# Patient Record
Sex: MALE | Race: BLACK OR AFRICAN AMERICAN | Employment: FULL TIME | URBAN - METROPOLITAN AREA
[De-identification: names, ages, dates, MRNs, and addresses within clinical notes are randomized per-mention and may not be internally consistent; named-entity substitution may affect disease eponyms.]

---

## 2019-05-04 ENCOUNTER — HOSPITAL ENCOUNTER (OUTPATIENT)
Age: 34
Setting detail: OBSERVATION
Discharge: HOME | End: 2019-05-05
Attending: EMERGENCY MEDICINE | Admitting: INTERNAL MEDICINE
Payer: COMMERCIAL

## 2019-05-04 ENCOUNTER — APPOINTMENT (OUTPATIENT)
Dept: GENERAL RADIOLOGY | Age: 34
End: 2019-05-04
Payer: COMMERCIAL

## 2019-05-04 ENCOUNTER — APPOINTMENT (OUTPATIENT)
Dept: CT IMAGING | Age: 34
End: 2019-05-04
Payer: COMMERCIAL

## 2019-05-04 DIAGNOSIS — T18.108A FOREIGN BODY IN ESOPHAGUS, INITIAL ENCOUNTER: Primary | ICD-10-CM

## 2019-05-04 LAB
ABSOLUTE EOS #: 0.59 K/UL (ref 0–0.44)
ABSOLUTE IMMATURE GRANULOCYTE: 0.01 K/UL (ref 0–0.3)
ABSOLUTE LYMPH #: 1.8 K/UL (ref 1.1–3.7)
ABSOLUTE MONO #: 0.48 K/UL (ref 0.1–1.2)
ANION GAP SERPL CALCULATED.3IONS-SCNC: 13 MMOL/L (ref 9–17)
BASOPHILS # BLD: 1 % (ref 0–2)
BASOPHILS ABSOLUTE: 0.05 K/UL (ref 0–0.2)
BUN BLDV-MCNC: 14 MG/DL (ref 6–20)
BUN/CREAT BLD: 13 (ref 9–20)
CALCIUM SERPL-MCNC: 9 MG/DL (ref 8.6–10.4)
CHLORIDE BLD-SCNC: 105 MMOL/L (ref 98–107)
CO2: 22 MMOL/L (ref 20–31)
CREAT SERPL-MCNC: 1.11 MG/DL (ref 0.7–1.2)
DIFFERENTIAL TYPE: ABNORMAL
EOSINOPHILS RELATIVE PERCENT: 9 % (ref 1–4)
GFR AFRICAN AMERICAN: >60 ML/MIN
GFR NON-AFRICAN AMERICAN: >60 ML/MIN
GFR SERPL CREATININE-BSD FRML MDRD: NORMAL ML/MIN/{1.73_M2}
GFR SERPL CREATININE-BSD FRML MDRD: NORMAL ML/MIN/{1.73_M2}
GLUCOSE BLD-MCNC: 92 MG/DL (ref 70–99)
HCT VFR BLD CALC: 47.1 % (ref 40.7–50.3)
HEMOGLOBIN: 15 G/DL (ref 13–17)
IMMATURE GRANULOCYTES: 0 %
LYMPHOCYTES # BLD: 27 % (ref 24–43)
MCH RBC QN AUTO: 26.7 PG (ref 25.2–33.5)
MCHC RBC AUTO-ENTMCNC: 31.8 G/DL (ref 28.4–34.8)
MCV RBC AUTO: 84 FL (ref 82.6–102.9)
MONOCYTES # BLD: 7 % (ref 3–12)
NRBC AUTOMATED: 0 PER 100 WBC
PDW BLD-RTO: 13.6 % (ref 11.8–14.4)
PLATELET # BLD: 197 K/UL (ref 138–453)
PLATELET ESTIMATE: ABNORMAL
PMV BLD AUTO: 11.2 FL (ref 8.1–13.5)
POTASSIUM SERPL-SCNC: 3.9 MMOL/L (ref 3.7–5.3)
RBC # BLD: 5.61 M/UL (ref 4.21–5.77)
RBC # BLD: ABNORMAL 10*6/UL
SEG NEUTROPHILS: 57 % (ref 36–65)
SEGMENTED NEUTROPHILS ABSOLUTE COUNT: 3.8 K/UL (ref 1.5–8.1)
SODIUM BLD-SCNC: 140 MMOL/L (ref 135–144)
WBC # BLD: 6.7 K/UL (ref 3.5–11.3)
WBC # BLD: ABNORMAL 10*3/UL

## 2019-05-04 PROCEDURE — 96375 TX/PRO/DX INJ NEW DRUG ADDON: CPT

## 2019-05-04 PROCEDURE — 70491 CT SOFT TISSUE NECK W/DYE: CPT

## 2019-05-04 PROCEDURE — 71250 CT THORAX DX C-: CPT

## 2019-05-04 PROCEDURE — 71046 X-RAY EXAM CHEST 2 VIEWS: CPT

## 2019-05-04 PROCEDURE — 85025 COMPLETE CBC W/AUTO DIFF WBC: CPT

## 2019-05-04 PROCEDURE — 2500000003 HC RX 250 WO HCPCS: Performed by: NURSE PRACTITIONER

## 2019-05-04 PROCEDURE — 2580000003 HC RX 258: Performed by: NURSE PRACTITIONER

## 2019-05-04 PROCEDURE — 36415 COLL VENOUS BLD VENIPUNCTURE: CPT

## 2019-05-04 PROCEDURE — 6360000002 HC RX W HCPCS: Performed by: NURSE PRACTITIONER

## 2019-05-04 PROCEDURE — 99244 OFF/OP CNSLTJ NEW/EST MOD 40: CPT | Performed by: INTERNAL MEDICINE

## 2019-05-04 PROCEDURE — 96374 THER/PROPH/DIAG INJ IV PUSH: CPT

## 2019-05-04 PROCEDURE — 6360000004 HC RX CONTRAST MEDICATION: Performed by: NURSE PRACTITIONER

## 2019-05-04 PROCEDURE — 43247 EGD REMOVE FOREIGN BODY: CPT | Performed by: INTERNAL MEDICINE

## 2019-05-04 PROCEDURE — 80048 BASIC METABOLIC PNL TOTAL CA: CPT

## 2019-05-04 PROCEDURE — 99285 EMERGENCY DEPT VISIT HI MDM: CPT

## 2019-05-04 RX ORDER — SODIUM CHLORIDE 0.9 % (FLUSH) 0.9 %
10 SYRINGE (ML) INJECTION PRN
Status: DISCONTINUED | OUTPATIENT
Start: 2019-05-04 | End: 2019-05-05 | Stop reason: HOSPADM

## 2019-05-04 RX ORDER — METOCLOPRAMIDE HYDROCHLORIDE 5 MG/ML
10 INJECTION INTRAMUSCULAR; INTRAVENOUS ONCE
Status: COMPLETED | OUTPATIENT
Start: 2019-05-04 | End: 2019-05-04

## 2019-05-04 RX ORDER — 0.9 % SODIUM CHLORIDE 0.9 %
80 INTRAVENOUS SOLUTION INTRAVENOUS ONCE
Status: COMPLETED | OUTPATIENT
Start: 2019-05-04 | End: 2019-05-04

## 2019-05-04 RX ORDER — PANTOPRAZOLE SODIUM 40 MG/1
40 TABLET, DELAYED RELEASE ORAL ONCE
Status: DISCONTINUED | OUTPATIENT
Start: 2019-05-04 | End: 2019-05-04

## 2019-05-04 RX ADMIN — IOPAMIDOL 75 ML: 755 INJECTION, SOLUTION INTRAVENOUS at 22:24

## 2019-05-04 RX ADMIN — SODIUM CHLORIDE 80 ML: 9 INJECTION, SOLUTION INTRAVENOUS at 22:24

## 2019-05-04 RX ADMIN — METOCLOPRAMIDE 10 MG: 5 INJECTION, SOLUTION INTRAMUSCULAR; INTRAVENOUS at 23:34

## 2019-05-04 RX ADMIN — Medication 10 ML: at 22:25

## 2019-05-04 RX ADMIN — GLUCAGON HYDROCHLORIDE 1 MG: KIT at 23:34

## 2019-05-05 ENCOUNTER — APPOINTMENT (OUTPATIENT)
Dept: GENERAL RADIOLOGY | Age: 34
End: 2019-05-05
Payer: COMMERCIAL

## 2019-05-05 ENCOUNTER — ANESTHESIA EVENT (OUTPATIENT)
Dept: OPERATING ROOM | Age: 34
End: 2019-05-05
Payer: COMMERCIAL

## 2019-05-05 ENCOUNTER — ANESTHESIA (OUTPATIENT)
Dept: OPERATING ROOM | Age: 34
End: 2019-05-05
Payer: COMMERCIAL

## 2019-05-05 VITALS
HEART RATE: 85 BPM | SYSTOLIC BLOOD PRESSURE: 139 MMHG | BODY MASS INDEX: 38.94 KG/M2 | HEIGHT: 69 IN | TEMPERATURE: 98.4 F | OXYGEN SATURATION: 98 % | RESPIRATION RATE: 16 BRPM | DIASTOLIC BLOOD PRESSURE: 77 MMHG | WEIGHT: 262.9 LBS

## 2019-05-05 VITALS
DIASTOLIC BLOOD PRESSURE: 61 MMHG | SYSTOLIC BLOOD PRESSURE: 129 MMHG | RESPIRATION RATE: 13 BRPM | OXYGEN SATURATION: 93 %

## 2019-05-05 PROCEDURE — C9113 INJ PANTOPRAZOLE SODIUM, VIA: HCPCS | Performed by: INTERNAL MEDICINE

## 2019-05-05 PROCEDURE — 3700000000 HC ANESTHESIA ATTENDED CARE: Performed by: INTERNAL MEDICINE

## 2019-05-05 PROCEDURE — 3609012900 HC EGD FOREIGN BODY REMOVAL: Performed by: INTERNAL MEDICINE

## 2019-05-05 PROCEDURE — 6360000002 HC RX W HCPCS: Performed by: INTERNAL MEDICINE

## 2019-05-05 PROCEDURE — 2580000003 HC RX 258: Performed by: NURSE PRACTITIONER

## 2019-05-05 PROCEDURE — 6370000000 HC RX 637 (ALT 250 FOR IP): Performed by: NURSE PRACTITIONER

## 2019-05-05 PROCEDURE — 96365 THER/PROPH/DIAG IV INF INIT: CPT

## 2019-05-05 PROCEDURE — APPNB30 APP NON BILLABLE TIME 0-30 MINS: Performed by: NURSE PRACTITIONER

## 2019-05-05 PROCEDURE — 99225 PR SBSQ OBSERVATION CARE/DAY 25 MINUTES: CPT | Performed by: INTERNAL MEDICINE

## 2019-05-05 PROCEDURE — 7100000000 HC PACU RECOVERY - FIRST 15 MIN: Performed by: INTERNAL MEDICINE

## 2019-05-05 PROCEDURE — 2580000003 HC RX 258: Performed by: ANESTHESIOLOGY

## 2019-05-05 PROCEDURE — 71046 X-RAY EXAM CHEST 2 VIEWS: CPT

## 2019-05-05 PROCEDURE — 6360000002 HC RX W HCPCS: Performed by: NURSE PRACTITIONER

## 2019-05-05 PROCEDURE — 3700000001 HC ADD 15 MINUTES (ANESTHESIA): Performed by: INTERNAL MEDICINE

## 2019-05-05 PROCEDURE — C9113 INJ PANTOPRAZOLE SODIUM, VIA: HCPCS | Performed by: NURSE PRACTITIONER

## 2019-05-05 PROCEDURE — 2580000003 HC RX 258: Performed by: INTERNAL MEDICINE

## 2019-05-05 PROCEDURE — 2709999900 HC NON-CHARGEABLE SUPPLY: Performed by: INTERNAL MEDICINE

## 2019-05-05 PROCEDURE — 2780000010 HC IMPLANT OTHER: Performed by: INTERNAL MEDICINE

## 2019-05-05 PROCEDURE — G0378 HOSPITAL OBSERVATION PER HR: HCPCS

## 2019-05-05 PROCEDURE — 99219 PR INITIAL OBSERVATION CARE/DAY 50 MINUTES: CPT | Performed by: INTERNAL MEDICINE

## 2019-05-05 PROCEDURE — 7100000001 HC PACU RECOVERY - ADDTL 15 MIN: Performed by: INTERNAL MEDICINE

## 2019-05-05 PROCEDURE — 2500000003 HC RX 250 WO HCPCS: Performed by: INTERNAL MEDICINE

## 2019-05-05 DEVICE — WORKING LENGTH 235CM, WORKING CHANNEL 2.8MM
Type: IMPLANTABLE DEVICE | Status: FUNCTIONAL
Brand: RESOLUTION 360 CLIP

## 2019-05-05 RX ORDER — PANTOPRAZOLE SODIUM 40 MG/10ML
40 INJECTION, POWDER, LYOPHILIZED, FOR SOLUTION INTRAVENOUS EVERY 12 HOURS SCHEDULED
Status: DISCONTINUED | OUTPATIENT
Start: 2019-05-05 | End: 2019-05-05 | Stop reason: HOSPADM

## 2019-05-05 RX ORDER — MAGNESIUM SULFATE 1 G/100ML
1 INJECTION INTRAVENOUS PRN
Status: DISCONTINUED | OUTPATIENT
Start: 2019-05-05 | End: 2019-05-05 | Stop reason: HOSPADM

## 2019-05-05 RX ORDER — FENTANYL CITRATE 50 UG/ML
25 INJECTION, SOLUTION INTRAMUSCULAR; INTRAVENOUS EVERY 5 MIN PRN
Status: DISCONTINUED | OUTPATIENT
Start: 2019-05-05 | End: 2019-05-05 | Stop reason: HOSPADM

## 2019-05-05 RX ORDER — SUCRALFATE 1 G/1
1 TABLET ORAL 4 TIMES DAILY
Qty: 60 TABLET | Refills: 0 | Status: SHIPPED | OUTPATIENT
Start: 2019-05-05 | End: 2019-05-20

## 2019-05-05 RX ORDER — LABETALOL HYDROCHLORIDE 5 MG/ML
5 INJECTION, SOLUTION INTRAVENOUS EVERY 10 MIN PRN
Status: DISCONTINUED | OUTPATIENT
Start: 2019-05-05 | End: 2019-05-05 | Stop reason: HOSPADM

## 2019-05-05 RX ORDER — OXYCODONE HYDROCHLORIDE AND ACETAMINOPHEN 5; 325 MG/1; MG/1
2 TABLET ORAL PRN
Status: DISCONTINUED | OUTPATIENT
Start: 2019-05-05 | End: 2019-05-05 | Stop reason: HOSPADM

## 2019-05-05 RX ORDER — SODIUM CHLORIDE 0.9 % (FLUSH) 0.9 %
10 SYRINGE (ML) INJECTION EVERY 12 HOURS SCHEDULED
Status: DISCONTINUED | OUTPATIENT
Start: 2019-05-05 | End: 2019-05-05 | Stop reason: HOSPADM

## 2019-05-05 RX ORDER — SUCRALFATE ORAL 1 G/10ML
1 SUSPENSION ORAL EVERY 6 HOURS SCHEDULED
Status: DISCONTINUED | OUTPATIENT
Start: 2019-05-05 | End: 2019-05-05

## 2019-05-05 RX ORDER — OXYCODONE HYDROCHLORIDE AND ACETAMINOPHEN 5; 325 MG/1; MG/1
1 TABLET ORAL PRN
Status: DISCONTINUED | OUTPATIENT
Start: 2019-05-05 | End: 2019-05-05 | Stop reason: HOSPADM

## 2019-05-05 RX ORDER — DIPHENHYDRAMINE HYDROCHLORIDE 50 MG/ML
12.5 INJECTION INTRAMUSCULAR; INTRAVENOUS
Status: DISCONTINUED | OUTPATIENT
Start: 2019-05-05 | End: 2019-05-05 | Stop reason: HOSPADM

## 2019-05-05 RX ORDER — SODIUM CHLORIDE 9 MG/ML
INJECTION, SOLUTION INTRAVENOUS CONTINUOUS
Status: DISCONTINUED | OUTPATIENT
Start: 2019-05-05 | End: 2019-05-05 | Stop reason: HOSPADM

## 2019-05-05 RX ORDER — ACETAMINOPHEN 325 MG/1
650 TABLET ORAL EVERY 4 HOURS PRN
Status: DISCONTINUED | OUTPATIENT
Start: 2019-05-05 | End: 2019-05-05 | Stop reason: HOSPADM

## 2019-05-05 RX ORDER — ONDANSETRON 2 MG/ML
4 INJECTION INTRAMUSCULAR; INTRAVENOUS EVERY 6 HOURS PRN
Status: DISCONTINUED | OUTPATIENT
Start: 2019-05-05 | End: 2019-05-05 | Stop reason: HOSPADM

## 2019-05-05 RX ORDER — 0.9 % SODIUM CHLORIDE 0.9 %
10 VIAL (ML) INJECTION EVERY 12 HOURS SCHEDULED
Status: DISCONTINUED | OUTPATIENT
Start: 2019-05-05 | End: 2019-05-05 | Stop reason: SDUPTHER

## 2019-05-05 RX ORDER — DEXAMETHASONE SODIUM PHOSPHATE 4 MG/ML
4 INJECTION, SOLUTION INTRA-ARTICULAR; INTRALESIONAL; INTRAMUSCULAR; INTRAVENOUS; SOFT TISSUE
Status: DISCONTINUED | OUTPATIENT
Start: 2019-05-05 | End: 2019-05-05 | Stop reason: HOSPADM

## 2019-05-05 RX ORDER — HYDROMORPHONE HYDROCHLORIDE 1 MG/ML
0.5 INJECTION, SOLUTION INTRAMUSCULAR; INTRAVENOUS; SUBCUTANEOUS EVERY 5 MIN PRN
Status: DISCONTINUED | OUTPATIENT
Start: 2019-05-05 | End: 2019-05-05 | Stop reason: HOSPADM

## 2019-05-05 RX ORDER — SUCRALFATE 1 G/1
1 TABLET ORAL 4 TIMES DAILY
Qty: 60 TABLET | Refills: 0 | Status: SHIPPED | OUTPATIENT
Start: 2019-05-05 | End: 2019-05-05

## 2019-05-05 RX ORDER — MEPERIDINE HYDROCHLORIDE 50 MG/ML
12.5 INJECTION INTRAMUSCULAR; INTRAVENOUS; SUBCUTANEOUS EVERY 5 MIN PRN
Status: DISCONTINUED | OUTPATIENT
Start: 2019-05-05 | End: 2019-05-05 | Stop reason: HOSPADM

## 2019-05-05 RX ORDER — PANTOPRAZOLE SODIUM 40 MG/1
40 TABLET, DELAYED RELEASE ORAL
Qty: 30 TABLET | Refills: 0 | Status: SHIPPED | OUTPATIENT
Start: 2019-05-05 | End: 2019-05-20

## 2019-05-05 RX ORDER — 0.9 % SODIUM CHLORIDE 0.9 %
10 VIAL (ML) INJECTION EVERY 12 HOURS SCHEDULED
Status: DISCONTINUED | OUTPATIENT
Start: 2019-05-05 | End: 2019-05-05 | Stop reason: HOSPADM

## 2019-05-05 RX ORDER — SUCRALFATE 1 G/1
1 TABLET ORAL EVERY 6 HOURS SCHEDULED
Status: DISCONTINUED | OUTPATIENT
Start: 2019-05-05 | End: 2019-05-05 | Stop reason: HOSPADM

## 2019-05-05 RX ORDER — SODIUM CHLORIDE 9 MG/ML
INJECTION, SOLUTION INTRAVENOUS CONTINUOUS PRN
Status: DISCONTINUED | OUTPATIENT
Start: 2019-05-05 | End: 2019-05-05 | Stop reason: SDUPTHER

## 2019-05-05 RX ORDER — ONDANSETRON 2 MG/ML
4 INJECTION INTRAMUSCULAR; INTRAVENOUS
Status: DISCONTINUED | OUTPATIENT
Start: 2019-05-05 | End: 2019-05-05 | Stop reason: HOSPADM

## 2019-05-05 RX ORDER — SUCRALFATE 1 G/1
1 TABLET ORAL 4 TIMES DAILY
Qty: 60 TABLET | Refills: 0 | Status: SHIPPED | OUTPATIENT
Start: 2019-05-05 | End: 2019-05-05 | Stop reason: HOSPADM

## 2019-05-05 RX ORDER — ONDANSETRON 4 MG/1
4 TABLET, ORALLY DISINTEGRATING ORAL EVERY 6 HOURS PRN
Status: DISCONTINUED | OUTPATIENT
Start: 2019-05-05 | End: 2019-05-05 | Stop reason: HOSPADM

## 2019-05-05 RX ORDER — NICOTINE 21 MG/24HR
1 PATCH, TRANSDERMAL 24 HOURS TRANSDERMAL DAILY PRN
Status: DISCONTINUED | OUTPATIENT
Start: 2019-05-05 | End: 2019-05-05 | Stop reason: HOSPADM

## 2019-05-05 RX ORDER — ONDANSETRON 2 MG/ML
4 INJECTION INTRAMUSCULAR; INTRAVENOUS EVERY 6 HOURS PRN
Status: DISCONTINUED | OUTPATIENT
Start: 2019-05-05 | End: 2019-05-05 | Stop reason: SDUPTHER

## 2019-05-05 RX ORDER — SODIUM CHLORIDE 0.9 % (FLUSH) 0.9 %
10 SYRINGE (ML) INJECTION PRN
Status: DISCONTINUED | OUTPATIENT
Start: 2019-05-05 | End: 2019-05-05 | Stop reason: HOSPADM

## 2019-05-05 RX ORDER — HYDRALAZINE HYDROCHLORIDE 20 MG/ML
5 INJECTION INTRAMUSCULAR; INTRAVENOUS EVERY 10 MIN PRN
Status: DISCONTINUED | OUTPATIENT
Start: 2019-05-05 | End: 2019-05-05 | Stop reason: HOSPADM

## 2019-05-05 RX ORDER — PANTOPRAZOLE SODIUM 40 MG/10ML
40 INJECTION, POWDER, LYOPHILIZED, FOR SOLUTION INTRAVENOUS EVERY 12 HOURS
Status: DISCONTINUED | OUTPATIENT
Start: 2019-05-05 | End: 2019-05-05 | Stop reason: SDUPTHER

## 2019-05-05 RX ORDER — PANTOPRAZOLE SODIUM 40 MG/1
40 TABLET, DELAYED RELEASE ORAL
Qty: 30 TABLET | Refills: 0 | Status: SHIPPED | OUTPATIENT
Start: 2019-05-05 | End: 2019-05-05 | Stop reason: HOSPADM

## 2019-05-05 RX ORDER — PANTOPRAZOLE SODIUM 40 MG/1
40 TABLET, DELAYED RELEASE ORAL
Qty: 30 TABLET | Refills: 0 | Status: SHIPPED | OUTPATIENT
Start: 2019-05-05 | End: 2019-05-05 | Stop reason: SDUPTHER

## 2019-05-05 RX ORDER — POTASSIUM CHLORIDE 20 MEQ/1
40 TABLET, EXTENDED RELEASE ORAL PRN
Status: DISCONTINUED | OUTPATIENT
Start: 2019-05-05 | End: 2019-05-05 | Stop reason: HOSPADM

## 2019-05-05 RX ORDER — DEXTROSE, SODIUM CHLORIDE, AND POTASSIUM CHLORIDE 5; .45; .15 G/100ML; G/100ML; G/100ML
INJECTION INTRAVENOUS CONTINUOUS
Status: DISCONTINUED | OUTPATIENT
Start: 2019-05-05 | End: 2019-05-05

## 2019-05-05 RX ORDER — POTASSIUM CHLORIDE 7.45 MG/ML
10 INJECTION INTRAVENOUS PRN
Status: DISCONTINUED | OUTPATIENT
Start: 2019-05-05 | End: 2019-05-05 | Stop reason: HOSPADM

## 2019-05-05 RX ADMIN — PIPERACILLIN SODIUM,TAZOBACTAM SODIUM 3.38 G: 3; .375 INJECTION, POWDER, FOR SOLUTION INTRAVENOUS at 02:17

## 2019-05-05 RX ADMIN — SODIUM CHLORIDE: 9 INJECTION, SOLUTION INTRAVENOUS at 03:48

## 2019-05-05 RX ADMIN — Medication 10 ML: at 10:12

## 2019-05-05 RX ADMIN — Medication 10 ML: at 10:17

## 2019-05-05 RX ADMIN — PANTOPRAZOLE SODIUM 40 MG: 40 INJECTION, POWDER, FOR SOLUTION INTRAVENOUS at 10:10

## 2019-05-05 RX ADMIN — SODIUM CHLORIDE: 9 INJECTION, SOLUTION INTRAVENOUS at 00:52

## 2019-05-05 RX ADMIN — PANTOPRAZOLE SODIUM 40 MG: 40 INJECTION, POWDER, FOR SOLUTION INTRAVENOUS at 00:02

## 2019-05-05 RX ADMIN — SUCRALFATE 1 G: 1 TABLET ORAL at 14:10

## 2019-05-05 RX ADMIN — TAZOBACTAM SODIUM AND PIPERACILLIN SODIUM 3.38 G: 375; 3 INJECTION, SOLUTION INTRAVENOUS at 10:10

## 2019-05-05 ASSESSMENT — PULMONARY FUNCTION TESTS
PIF_VALUE: 1
PIF_VALUE: 0
PIF_VALUE: 0
PIF_VALUE: 1
PIF_VALUE: 3
PIF_VALUE: 1
PIF_VALUE: 0
PIF_VALUE: 0
PIF_VALUE: 1
PIF_VALUE: 0
PIF_VALUE: 3
PIF_VALUE: 1
PIF_VALUE: 0
PIF_VALUE: 0
PIF_VALUE: 1
PIF_VALUE: 0
PIF_VALUE: 1
PIF_VALUE: 0
PIF_VALUE: 1
PIF_VALUE: 0
PIF_VALUE: 0
PIF_VALUE: 1

## 2019-05-05 ASSESSMENT — PAIN SCALES - GENERAL
PAINLEVEL_OUTOF10: 0

## 2019-05-05 NOTE — PROGRESS NOTES
Patient arrived to the room via bed, report given at bedside, patient is not in any distress of discomfort. Pt given orientation to the room and all  Admission questions answered.  Patient has no further questions at this time

## 2019-05-05 NOTE — H&P
[facility]    HISTORY AND PHYSICAL EXAMINATION            Date:   5/5/2019  Patient name:  Marlena Vo  Date of admission:  5/4/2019  9:39 PM  MRN:   [id number]  Account:  [de-identified]  YOB: 1985  PCP:    No primary care provider on file. Room:   2033/2033-01  Code Status:    Full Code    Chief Complaint:     Chief Complaint   Patient presents with    Dysphagia       History Obtained From:     patient, electronic medical record    History of Present Illness:     Admitted through ER with following history;      Marlena Vo is a 36 y.o. male who presents to the emergency department via private auto after choking episode occurred less than one hour prior to arrival.  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 He is not having any difficulty breathing but continues to have foreign body sensation in the throat. He has had similar episodes over the past couple of years that did not require an emergency room visit. He has never been evaluated for these symptoms. He denies history of GERD or heartburn. He was found to have large Food impaction at the distal esophagus with small hiatus hernia on EGD  Food  was impacted with 2 cm laceration of the esophagus, for details see the EGD report below    He has been started on IV antibiotics and was kept under observation overnight to look for any perforation.   Patient denies any difficulty breathing or pain, new chest x-ray did not show any pneumomediastinum    Following his EGD report and diagnosis:  Diagnosis:  Large food impaction at the distal esophagus and in a small hiatal hernia sac, we was pushed through the stomach, but while I was pushing it down to the stomach gently the patient coughed forcefully and subtly and jammed that area again his the scope, after that the food was pushed to the stomach but I see a 2 cm laceration right at the [address] [place] [address] & [facility] junction, which was fairly deep, I put one clip on the top and around with another clip under that but the patient kept coughing, and Breathing very hardly moving the lower area very badly where I felt unsafe anymore to stay there not to extend the laceration he had. The scope was pulled  Patient was awake after the anesthesia stopped  He denied any chest pain he denied any shortness breath is vitals To be very normal  But because of the laceration being so deep medicine for stat x-ray at this time  Might need also a stat CT chest if there is any concern on the chest x-ray  And then will keep this patient for observation and put him on antibiotics in case she had a perforation  Patient breath sounds were equal and clear, and there is no crepitus under the skin to indicate perforation clinically, but will get the x-rays as mentioned above        The food right now it is in the stomach and the stomach looked normal  Duodenum was not examined because we have to pull out        Past Medical History:     History reviewed. No pertinent past medical history. Past Surgical History:     Past Surgical History:   Procedure Laterality Date    ENDOSCOPY, COLON, DIAGNOSTIC  05/05/2019    foreign body removal and application of hemostatic clips        Medications Prior to Admission:     Prior to Admission medications    Not on File        Allergies:     Patient has no known allergies. Social History:     Tobacco:    has no tobacco history on file. Alcohol:      has no alcohol history on file. Drug Use:  has no drug history on file. Family History:     History reviewed. No pertinent family history. Review of Systems:     Positive and Negative as described in HPI.     CONSTITUTIONAL:  negative for fevers, chills, sweats, fatigue, weight loss  HEENT:  negative for vision, hearing changes, runny nose,+ throat pain with feeling of foreign body in esophagus  RESPIRATORY:  negative for shortness of breath, cough, or splenomegaly  Neurologic: There are no new focal motor or sensory deficits, normal muscle tone and bulk, no abnormal sensation, normal speech, cranial nerves II through XII grossly intact  Skin: No gross lesions, rashes, bruising or bleeding on exposed skin area  Extremities:  peripheral pulses palpable, no pedal edema or calf pain with palpation  Psych: normal affect     Investigations:      Laboratory Testing:  Recent Results (from the past 24 hour(s))   CBC Auto Differential    Collection Time: 05/04/19 10:00 PM   Result Value Ref Range    WBC 6.7 3.5 - 11.3 k/uL    RBC 5.61 4.21 - 5.77 m/uL    Hemoglobin 15.0 13.0 - 17.0 g/dL    Hematocrit 47.1 40.7 - 50.3 %    MCV 84.0 82.6 - 102.9 fL    MCH 26.7 25.2 - 33.5 pg    MCHC 31.8 28.4 - 34.8 g/dL    RDW 13.6 11.8 - 14.4 %    Platelets 342 343 - 425 k/uL    MPV 11.2 8.1 - 13.5 fL    NRBC Automated 0.0 0.0 per 100 WBC    Differential Type NOT REPORTED     WBC Morphology NOT REPORTED     RBC Morphology NOT REPORTED     Platelet Estimate NOT REPORTED     Seg Neutrophils 57 36 - 65 %    Lymphocytes 27 24 - 43 %    Monocytes 7 3 - 12 %    Eosinophils % 9 (H) 1 - 4 %    Basophils 1 0 - 2 %    Immature Granulocytes 0 0 %    Segs Absolute 3.80 1.50 - 8.10 k/uL    Absolute Lymph # 1.80 1.10 - 3.70 k/uL    Absolute Mono # 0.48 0.10 - 1.20 k/uL    Absolute Eos # 0.59 (H) 0.00 - 0.44 k/uL    Basophils # 0.05 0.00 - 0.20 k/uL    Absolute Immature Granulocyte 0.01 0.00 - 0.30 k/uL   Basic Metabolic Panel    Collection Time: 05/04/19 10:00 PM   Result Value Ref Range    Glucose 92 70 - 99 mg/dL    BUN 14 6 - 20 mg/dL    CREATININE 1.11 0.70 - 1.20 mg/dL    Bun/Cre Ratio 13 9 - 20    Calcium 9.0 8.6 - 10.4 mg/dL    Sodium 140 135 - 144 mmol/L    Potassium 3.9 3.7 - 5.3 mmol/L    Chloride 105 98 - 107 mmol/L    CO2 22 20 - 31 mmol/L    Anion Gap 13 9 - 17 mmol/L    GFR Non-African American >60 >60 mL/min    GFR African American >60 >60 mL/min    GFR Comment          GFR Staging NOT valleculae, epiglottis, aryepiglottic folds and pyriform sinuses appear unremarkable. The true and false vocal cords are normal in appearance. No mass or abscess is seen. SALIVARY GLANDS/THYROID:  The parotid and submandibular glands appear unremarkable. The thyroid gland appears unremarkable. LYMPH NODES:  No cervical or supraclavicular lymphadenopathy is seen. SOFT TISSUES:  No appreciable soft tissue swelling or mass is seen. BRAIN/ORBITS/SINUSES:  The visualized portion of the intracranial contents appear unremarkable. The visualized portion of the orbits and mastoid air cells demonstrate no acute abnormality. Mild pansinus disease. LUNG APICES/SUPERIOR MEDIASTINUM:  No focal consolidation is seen within the visualized lung apices. No superior mediastinal lymphadenopathy or mass. The visualized portion of the trachea appears unremarkable. BONES:  No aggressive appearing lytic or blastic bony lesion. Mild multilevel cervical spondylosis. No acute abnormality of the soft tissue structures of the neck. No radiopaque foreign body. Mild paranasal sinus disease. Ct Chest Wo Contrast    Result Date: 5/4/2019  EXAMINATION: CT OF THE CHEST WITHOUT CONTRAST 5/4/2019 10:42 pm TECHNIQUE: CT of the chest was performed without the administration of intravenous contrast. Multiplanar reformatted images are provided for review. Dose modulation, iterative reconstruction, and/or weight based adjustment of the mA/kV was utilized to reduce the radiation dose to as low as reasonably achievable. COMPARISON: Chest x-ray 05/04/2019 at 2206 hours HISTORY: ORDERING SYSTEM PROVIDED HISTORY: fb sensation FINDINGS: Mediastinum: There is a small hiatal hernia with central soft tissue nodule (axial image 77-89). The esophagus proximal to the small hiatal hernia is slightly dilated and filled with fluid and gas. There is no mediastinal adenopathy. Thoracic aorta is normal in caliber. Heart size is normal. Lungs/pleura:  The lungs are clear. No pneumothorax or pleural fluid. Upper Abdomen: Scans performed through the upper abdomen are unremarkable. There is contrast material in the kidneys from recent contrast CT examination of the neck. Soft Tissues/Bones: There is no acute bone or soft tissue abnormality. There is a small hiatal hernia with central soft tissue nodule suspicious for food bolus impaction. Follow-up endoscopy may be required. The esophagus proximal to the hiatal hernia is mildly dilated. Chest CT is otherwise unremarkable. Assessment :      Primary Problem  Food impaction of esophagus-status post EGD and disimpaction of stuck food  2 cm laceration of esophagus  Active Hospital Problems    Diagnosis Date Noted    Food impaction of esophagus [T18.128A]        Plan:     Patient status Admit as inpatient in the  Progressive Unit/Step down    1. Continue Zosyn every 6 hours  2. Already started on clears by GI. 3.  Discharge when cleared by GI possibly today afternoon    Consultations:   IP CONSULT TO GI  IP CONSULT TO INTERNAL MEDICINE     Patient is admitted as inpatient status because of co-morbidities listed above, severity of signs and symptoms as outlined, requirement for current medical therapies and most importantly because of direct risk to patient if care not provided in a hospital setting. Ronnie Chavez MD  5/5/2019  9:01 AM    Copy sent to Dr. Sophia Reyna primary care provider on file.

## 2019-05-05 NOTE — DISCHARGE SUMMARY
[facility]    Discharge Summary     Patient ID: Gloria Chao  :  1985   MRN: [id number]     ACCOUNT:  [de-identified]   Patient's PCP: No primary care provider on file. Admit Date: 2019   Discharge Date: 2019     Length of Stay: 0  Code Status:  Full Code  Admitting Physician: Ramiro Reina DO  Discharge Physician: Tram Meyer MD     Active Discharge Diagnoses:     Hospital Problem Lists:  Principal Problem:    Food impaction of esophagus  Active Problems:    Foreign body in esophagus  Resolved Problems:    * No resolved hospital problems. *      Admission Condition:  poor     Discharged Condition: good    Hospital Stay:     Hospital Course:   Armnado Haider a 36 y. o. male who presents to the emergency department via private auto after choking episode occurred less than one hour prior to arrival.  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 Nely Polanco is not having any difficulty breathing but continues to have foreign body sensation in the throat.  He has had similar episodes over the past couple of years that did not require an emergency room visit. Nely Polanco has never been evaluated for these symptoms. Nely Polanco denies history of GERD or heartburn. He was found to have large Food impaction at the distal esophagus with small hiatus hernia on EGD  Food  was impacted with 2 cm laceration of the esophagus, for details see the EGD report below     He has been started on IV antibiotics and was kept under observation overnight to look for any perforation.   Patient denies any difficulty breathing or pain, new chest x-ray did not show any pneumomediastinum     Following his EGD report and diagnosis:  Diagnosis:  Large food impaction at the distal esophagus and in a small hiatal hernia sac, we was pushed through the stomach, but while I was pushing it down to the stomach gently the patient coughed forcefully and subtly and jammed that area again his the scope, after that the food was pushed to the stomach but I see a 2 cm laceration right at the GE junction, which was fairly deep, I put one clip on the top and around with another clip under that but the patient kept coughing, and Breathing very hardly moving the lower area very badly where I felt unsafe anymore to stay there not to extend the laceration he had.   The scope was pulled  Patient was awake after the anesthesia stopped  He denied any chest pain he denied any shortness breath is vitals To be very normal  But because of the laceration being so deep medicine for stat x-ray at this time  Might need also a stat CT chest if there is any concern on the chest x-ray  And then will keep this patient for observation and put him on antibiotics in case she had a perforation  Patient breath sounds were equal and clear, and there is no crepitus under the skin to indicate perforation clinically, but will get the x-rays as mentioned above        The food right now it is in the stomach and the stomach looked normal  Duodenum was not examined because we have to pull out     Primary Problem  Food impaction of esophagus-status post EGD and disimpaction of stuck food  2 cm laceration of esophagus       Active Hospital Problems     Diagnosis Date Noted    Food impaction of esophagus [T18.128A]        Plan;  Patient tolerated clear liquid diet  Morning chest x-ray negative for any pneumomediastinum  Cleared by GI to her discharged on oral PPI is and Carafate              Significant therapeutic interventions: See above notes    Significant Diagnostic Studies:   Labs / Micro:  CBC:   Lab Results   Component Value Date    WBC 6.7 05/04/2019    RBC 5.61 05/04/2019    HGB 15.0 05/04/2019    HCT 47.1 05/04/2019    MCV 84.0 05/04/2019    MCH 26.7 05/04/2019    MCHC 31.8 05/04/2019    RDW 13.6 05/04/2019     05/04/2019     BMP:    Lab Results   Component Value Date    GLUCOSE 92 body sensation TECHNOLOGIST PROVIDED HISTORY: resolved choking episode while eating. Foreign body sensation Ordering Physician Provided Reason for Exam: FB sensation in throat Acuity: Acute Type of Exam: Initial FINDINGS: Lungs are clear. No cardiomegaly. No pulmonary edema. Negative chest radiographs. Ct Soft Tissue Neck W Contrast    Result Date: 5/4/2019  EXAMINATION: CT OF THE NECK SOFT TISSUE WITH CONTRAST  5/4/2019 TECHNIQUE: CT of the neck was performed with the administration of intravenous contrast. Multiplanar reformatted images are provided for review. Dose modulation, iterative reconstruction, and/or weight based adjustment of the mA/kV was utilized to reduce the radiation dose to as low as reasonably achievable. COMPARISON: None. HISTORY: ORDERING SYSTEM PROVIDED HISTORY: Foreign body sensation, choking episode while eating prior to arrival FINDINGS: PHARYNX/LARYNX:  The palatine tonsils are normal in appearance. The tongue is normal in appearance. The valleculae, epiglottis, aryepiglottic folds and pyriform sinuses appear unremarkable. The true and false vocal cords are normal in appearance. No mass or abscess is seen. SALIVARY GLANDS/THYROID:  The parotid and submandibular glands appear unremarkable. The thyroid gland appears unremarkable. LYMPH NODES:  No cervical or supraclavicular lymphadenopathy is seen. SOFT TISSUES:  No appreciable soft tissue swelling or mass is seen. BRAIN/ORBITS/SINUSES:  The visualized portion of the intracranial contents appear unremarkable. The visualized portion of the orbits and mastoid air cells demonstrate no acute abnormality. Mild pansinus disease. LUNG APICES/SUPERIOR MEDIASTINUM:  No focal consolidation is seen within the visualized lung apices. No superior mediastinal lymphadenopathy or mass. The visualized portion of the trachea appears unremarkable. BONES:  No aggressive appearing lytic or blastic bony lesion.   Mild multilevel cervical spondylosis. No acute abnormality of the soft tissue structures of the neck. No radiopaque foreign body. Mild paranasal sinus disease. Ct Chest Wo Contrast    Result Date: 5/4/2019  EXAMINATION: CT OF THE CHEST WITHOUT CONTRAST 5/4/2019 10:42 pm TECHNIQUE: CT of the chest was performed without the administration of intravenous contrast. Multiplanar reformatted images are provided for review. Dose modulation, iterative reconstruction, and/or weight based adjustment of the mA/kV was utilized to reduce the radiation dose to as low as reasonably achievable. COMPARISON: Chest x-ray 05/04/2019 at 2206 hours HISTORY: ORDERING SYSTEM PROVIDED HISTORY: fb sensation FINDINGS: Mediastinum: There is a small hiatal hernia with central soft tissue nodule (axial image 77-89). The esophagus proximal to the small hiatal hernia is slightly dilated and filled with fluid and gas. There is no mediastinal adenopathy. Thoracic aorta is normal in caliber. Heart size is normal. Lungs/pleura: The lungs are clear. No pneumothorax or pleural fluid. Upper Abdomen: Scans performed through the upper abdomen are unremarkable. There is contrast material in the kidneys from recent contrast CT examination of the neck. Soft Tissues/Bones: There is no acute bone or soft tissue abnormality. There is a small hiatal hernia with central soft tissue nodule suspicious for food bolus impaction. Follow-up endoscopy may be required. The esophagus proximal to the hiatal hernia is mildly dilated. Chest CT is otherwise unremarkable. Consultations:    Consults:     Final Specialist Recommendations/Findings:   IP CONSULT TO GI  IP CONSULT TO INTERNAL MEDICINE      The patient was seen and examined on day of discharge and this discharge summary is in conjunction with any daily progress note from day of discharge.     Discharge plan:     Disposition: Home    Physician Follow Up:     Ilya Wade MD  [address] [place] [address] Radha Tello MD  [address]  [phone]    Schedule an appointment as soon as possible for a visit in 2 weeks  follow up for throat       Requiring Further Evaluation/Follow Up POST HOSPITALIZATION/Incidental Findings: Follow with GI as scheduled    Diet: As recommended by GI    Activity: As tolerated    Instructions to Patient: No solid food for 1 week, continue PPI and Carafate for 2 weeks and then follow with GI    Discharge Medications:      Medication List      START taking these medications    pantoprazole 40 MG tablet  Commonly known as:  PROTONIX  Take 1 tablet by mouth 2 times daily (before meals) for 15 days     sucralfate 1 GM tablet  Commonly known as:  CARAFATE  Take 1 tablet by mouth 4 times daily for 15 days           Where to Get Your Medications      These medications were sent to [address] [place] [id number]    Phone:  [phone]   · pantoprazole 40 MG tablet  · sucralfate 1 GM tablet         Time Spent on discharge is  20 mins in patient examination, evaluation, counseling as well as medication reconciliation, prescriptions for required medications, discharge plan and follow up. Electronically signed by   Onur Kruse MD  5/5/2019  2:58 PM      Thank you Dr. Jere Alvares primary care provider on file. for the opportunity to be involved in this patient's care.

## 2019-05-05 NOTE — CONSULTS
Gastroenterology Consult Note      Patient: Manuel Clinton  : 1985  Acct#:  [de-identified]     Date:  2019    Subjective:       History of Present Illness  Patient is a 36 y.o.  male admitted with No admission diagnoses are documented for this encounter. 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 Head choking keep the emergency room because he kept feeling something is stuck in his chest not able to swallow his saliva not able to swallow any liquid  Came in a private car  He did not have any respiratory issue  I was called by the emergency room to come and evaluate with an EGD  Patient was not having any difficulty breathing patient had no bleeding. X-ray of the chest and soft tissue of the neck showed no radiopaque foreign body or bone seen  The chest x-ray showing soft tissue nodule suspicious for food bolus impaction with a small hiatal hernia. Patient had similar episode in the past did not seek medical attention  Denied any severe reflux symptoms        No past medical history on file. No past surgical history on file. Past Endoscopic History none     Admission Meds  No current facility-administered medications on file prior to encounter. No current outpatient medications on file prior to encounter. Patient   Does Use ASA, NSAID No  Allergies  No Known Allergies     Social   Social History     Tobacco Use    Smoking status: Not on file   Substance Use Topics    Alcohol use: Not on file        PSYCH HISTORY:  Depression No  Anxiety No  Suicide No       No family history on file. No family history of colon cancer, Crohn's disease, or ulcerative colitis.      Review of Systems  Constitutional: negative  Eyes: negative  Ears, nose, mouth, throat, and face: negative  Respiratory: negative  Cardiovascular: negative  Gastrointestinal: negative  Genitourinary:negative  Integument/breast:   Ca 15-3:  No results found for:   AFP:  No components found for: AFAFP  Beta HCG:  No components found for: BHCG  Neuron Specific Enolase:  No results found for: NSE  Imaging Studies:                           All appropriate imaging studies and reports reviewed: Yes                 Assessment:     Active Problems:    * No active hospital problems. *  Resolved Problems:    * No resolved hospital problems. *    Food impaction    Recommendations:   egd now   ivf    based on result of egd further recommendations  Will need PPI  And out pt follow up and dilation                           Thank you for allowing me to participate in the care of your patient. Please feel free to contact me with any questions or concerns.      Laurie Lockwood MD

## 2019-05-05 NOTE — PROGRESS NOTES
Discharge Note:      All discharge instructions given at this time as well as all patient belongings returned to patient. Pt denies any further questions regarding discharge at this time. Pt given also given a RED FOLDER with unit letter, discharge instructions/restrictions and medication handouts regarding all discharge medications and side effects. Pt denies any further issues at this time. Pt left premises without any issues in private vehicle at this time.

## 2019-05-05 NOTE — OP NOTE
PROCEDURE NOTE    DATE OF PROCEDURE: 5/5/2019     SURGEON: Iza Rios MD  Facility: [facility] DR YAMILKA BARNES  ASSISTANT: None  Anesthesia: Mac  PREOPERATIVE DIAGNOSIS:   Food impaction    Diagnosis:  Large food impaction at the distal esophagus and in a small hiatal hernia sac, we was pushed through the stomach, but while I was pushing it down to the stomach gently the patient coughed forcefully and subtly and jammed that area again his the scope, after that the food was pushed to the stomach but I see a 2 cm laceration right at the GE junction, which was fairly deep, I put one clip on the top and around with another clip under that but the patient kept coughing, and Breathing very hardly moving the lower area very badly where I felt unsafe anymore to stay there not to extend the laceration he had. The scope was pulled  Patient was awake after the anesthesia stopped  He denied any chest pain he denied any shortness breath is vitals To be very normal  But because of the laceration being so deep medicine for stat x-ray at this time  Might need also a stat CT chest if there is any concern on the chest x-ray  And then will keep this patient for observation and put him on antibiotics in case she had a perforation  Patient breath sounds were equal and clear, and there is no crepitus under the skin to indicate perforation clinically, but will get the x-rays as mentioned above      The food right now it is in the stomach and the stomach looked normal  Duodenum was not examined because we have to pull out          POSTOPERATIVE DIAGNOSIS: As described below    OPERATION: Upper GI endoscopy with Biopsy    ANESTHESIA: Moderate Sedation     ESTIMATED BLOOD LOSS: Less than 50 ml    COMPLICATIONS: None. SPECIMENS:  Was Not Obtained    HISTORY: The patient is a 36y.o. year old male with history of above preop diagnosis.   I recommended esophagogastroduodenoscopy with possible biopsy and I explained the risk, benefits, expected outcome, and alternatives to the procedure. Risks included but are not limited to bleeding, infection, respiratory distress, hypotension, and perforation of the esophagus, stomach, or duodenum. Patient understands and is in agreement. PROCEDURE: The patient was given IV conscious sedation. The patient's SPO2 remained above 90% throughout the procedure. The gastroscope was inserted orally and advanced under direct vision through the esophagus, through the stomach,     Post sedation note : The patient's SPO2 remained above 90% throughout the procedure. the vital signs remained stable     Findings:    Retropharyngeal area was grossly normal appearing    Large food impaction at the distal esophagus and in a small hiatal hernia sac, we was pushed through the stomach, but while I was pushing it down to the stomach gently the patient coughed forcefully and subtly and jammed that area again his the scope, after that the food was pushed to the stomach but I see a 2 cm laceration right at the GE junction, which was fairly deep, I put one clip on the top and around with another clip under that but the patient kept coughing, and Breathing very hardly moving the lower area very badly where I felt unsafe anymore to stay there not to extend the laceration he had.   The scope was pulled  Patient was awake after the anesthesia stopped  He denied any chest pain he denied any shortness breath is vitals To be very normal  But because of the laceration being so deep medicine for stat x-ray at this time  Might need also a stat CT chest if there is any concern on the chest x-ray  And then will keep this patient for observation and put him on antibiotics in case she had a perforation  Patient breath sounds were equal and clear, and there is no crepitus under the skin to indicate perforation clinically, but will get the x-rays as mentioned above      The food right now it is in the stomach and the stomach looked normal  Duodenum was not examined because we have to pull out      The scope was removed and the patient tolerated the procedure well. Recommendations/Plan:   1. Stat PA and lateral chest x-ray rule out mediastinal air rule out pneumothorax rule out perforation of the esophagus  2. Will admit tonight if there is no perforation obvious on the x-ray  3. If there is perforation with consult with cardiothoracic surgery and see if we need to transfer this patient to another facility  4. Patient was given a stat dose of Zosyn in case of perforation  5.  Down the road the patient is to be on Carafate needs to be on PPI and is to be followed outpatient    Electronically signed by Massiel Rondon MD  on 5/5/2019 at 1:45 AM

## 2019-05-05 NOTE — PROGRESS NOTES
Radiologist read the x-ray  There is no mediastinal air  No perforation  No pneumothorax    I discussed the case with Dr. Raven Powell in the ER  Recommended to admit the patient for 23 hour hold  Chemo on Zosyn every 6 hours  Based on how he does tomorrow go ahead and start diet and probably discharge

## 2019-05-05 NOTE — ANESTHESIA PRE PROCEDURE
05/04/2019    BUN 14 05/04/2019    CREATININE 1.11 05/04/2019    GFRAA >60 05/04/2019    LABGLOM >60 05/04/2019    GLUCOSE 92 05/04/2019    CALCIUM 9.0 05/04/2019       POC Tests: No results for input(s): POCGLU, POCNA, POCK, POCCL, POCBUN, POCHEMO, POCHCT in the last 72 hours. Coags: No results found for: PROTIME, INR, APTT    HCG (If Applicable): No results found for: PREGTESTUR, PREGSERUM, HCG, HCGQUANT     ABGs: No results found for: PHART, PO2ART, TEN4IZE, CZO3MUL, BEART, O4PCPJUU     Type & Screen (If Applicable):  No results found for: [facility]    Anesthesia Evaluation  Patient summary reviewed and Nursing notes reviewed  Airway: Mallampati: II  TM distance: >3 FB   Neck ROM: full  Mouth opening: > = 3 FB Dental: normal exam         Pulmonary:normal exam  breath sounds clear to auscultation                             Cardiovascular:  Exercise tolerance: good (>4 METS),           Rhythm: regular  Rate: normal                    Neuro/Psych:               GI/Hepatic/Renal:             Endo/Other:                     Abdominal:       Abdomen: soft. Vascular:                                        Anesthesia Plan      general     ASA 1 - emergent       Induction: intravenous. Anesthetic plan and risks discussed with patient. Use of blood products discussed with patient whom consented to blood products. Plan discussed with attending and CRNA.     Attending anesthesiologist reviewed and agrees with Kassandra Heard MD   5/5/2019

## 2019-05-05 NOTE — ED PROVIDER NOTES
[facility] Name: Gina Jimenez  MRN: [id number]  [place] 1985  Date of evaluation: 5/4/2019  Provider: MIL Anaya CNP    CHIEF COMPLAINT       Chief Complaint   Patient presents with    Dysphagia         HISTORY OF PRESENT ILLNESS  (Location/Symptom, Timing/Onset, Context/Setting, Quality, Duration, Modifying Factors, Severity.)   Gina Jimenez is a 36 y.o. male who presents to the emergency department via private auto after choking episode occurred less than one hour prior to arrival.  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 He is not having any difficulty breathing but continues to have foreign body sensation in the throat. He has had similar episodes over the past couple of years that did not require an emergency room visit. He has never been evaluated for these symptoms. He denies history of GERD or heartburn. Nursing Notes were reviewed. ALLERGIES     Patient has no known allergies. CURRENT MEDICATIONS       There are no discharge medications for this patient. PAST MEDICAL HISTORY   No past medical history on file. SURGICAL HISTORY           Procedure Laterality Date    ENDOSCOPY, COLON, DIAGNOSTIC  05/05/2019    foreign body removal and application of hemostatic clips         FAMILY HISTORY     No family history on file. No family status information on file. SOCIAL HISTORY          REVIEW OF SYSTEMS    (2-9 systems for level 4, 10 or more for level 5)     Review of Systems   All other systems reviewed and are negative. Except as noted above the remainder of the review of systems was reviewed and negative.      PHYSICAL EXAM    (up to 7 for level 4, 8 or more for level 5)     Vitals:    05/04/19 2057 05/05/19 0130   BP: (!) 159/97 131/67   Pulse: 87 102   Resp: 16 20   Temp: 98.4 °F (36.9 °C) 97.5 °F (36.4 °C)   TempSrc: Oral Temporal   SpO2: 92% 96%   Weight: 262 lb 14.4 oz (119.3 kg)    Height: 5' 9\" (1.753 m)          Physical Exam   Constitutional: He appears well-developed and well-nourished. HENT:   Mouth/Throat: Oropharynx is clear and moist. No oropharyngeal exudate. Foreign body sensation in the throat   Eyes: Conjunctivae are normal. Right eye exhibits no discharge. Left eye exhibits no discharge. Cardiovascular: Normal rate and regular rhythm. Pulmonary/Chest: Breath sounds normal. No respiratory distress. DIAGNOSTIC RESULTS     EKG: All EKG's are interpreted by the Emergency Department Physician who either signs or Co-signs this chart in the absence of a cardiologist.    RADIOLOGY:   Non-plain film images such as CT, Ultrasound and MRI are read by the radiologist. Plain radiographic images are visualized and preliminarily interpreted by the emergency physician with the below findings:    Interpretation per the Radiologist below, if available at the time of this note:    CT CHEST [address]   Final Result   There is a small hiatal hernia with central soft tissue nodule suspicious for   food bolus impaction. Follow-up endoscopy may be required. The esophagus proximal to the hiatal hernia is mildly dilated. Chest CT is otherwise unremarkable. CT SOFT TISSUE NECK W CONTRAST   Final Result   No acute abnormality of the soft tissue structures of the neck. No   radiopaque foreign body. Mild paranasal sinus disease. XR CHEST STANDARD (2 VW)   Final Result   Negative chest radiographs. XR CHEST STANDARD (2 VW)    (Results Pending)       LABS:  Labs Reviewed   CBC WITH AUTO DIFFERENTIAL - Abnormal; Notable for the following components:       Result Value    Eosinophils % 9 (*)     Absolute Eos # 0.59 (*)     All other components within normal limits   BASIC METABOLIC PANEL       All other labs were within normal range or not returned as of this dictation.     EMERGENCY DEPARTMENT COURSE and DIFFERENTIAL DIAGNOSIS/MDM: Vitals:    Vitals:    197 19 0130   BP: (!) 159/97 131/67   Pulse: 87 102   Resp: 16 20   Temp: 98.4 °F (36.9 °C) 97.5 °F (36.4 °C)   TempSrc: Oral Temporal   SpO2: 92% 96%   Weight: 262 lb 14.4 oz (119.3 kg)    Height: 5' 9\" (1.753 m)        Medical Decision Makin-year-old male with foreign body sensation. Chest x-rays not have any evidence of aspiration pneumonia. CT soft tissue neck is also negative. CT chest does have concern for a hernia and food bolus. He will be taken to surgery with GI consult. CONSULTS:  IP CONSULT TO GI    PROCEDURES:  None    FINAL IMPRESSION      1. Foreign body in esophagus, initial encounter          DISPOSITION/PLAN   DISPOSITION Ed Observation 2019 11:27:33 PM      PATIENT REFERRED TO:   Jamison Dolan MD  [address]  [phone]            DISCHARGE MEDICATIONS:     There are no discharge medications for this patient. (Please note that portions of this note were completed with a voice recognition program.  Efforts were made to edit the dictations but occasionally words are mis-transcribed. )    MIL LOCO - MIL Ruiz CNP  19 0145

## 2019-05-05 NOTE — FLOWSHEET NOTE
Patient + 3 family members were present. Patient states small procedure states well. No major needs or prayers requested.  shared in presence. Follow up as needed. 05/05/19 1527   Encounter Summary   Services provided to: Patient and family together   Referral/Consult From: [facility];Spouse   Continue Visiting   (5-5-19)   Complexity of Encounter Low   Length of Encounter 15 minutes   Spiritual Assessment Completed Yes   Routine   Type Initial   Assessment Approachable;Calm   Intervention Explored feelings, thoughts, concerns; Discussed illness/injury and it's impact; Discussed belief system/Hoahaoism practices/tyrese   Outcome Expressed gratitude;Receptive

## 2019-05-05 NOTE — ANESTHESIA POSTPROCEDURE EVALUATION
Department of Anesthesiology  Postprocedure Note    Patient: Niall Espinal  MRN: [id number]  YOB: 1985  Date of evaluation: 5/5/2019  Time:  1:34 AM     Procedure Summary     Date:  05/05/19 Room / Location:  [place] [address] / [facility] OR    Anesthesia Start:  0052 Anesthesia Stop:  1607    Procedure:  EGD FOREIGN BODY REMOVAL AND APPLICATION OF HEMOSTATIC CLIPS (N/A ) Diagnosis:  (FOOD BOLUS)    Surgeon:  Massiel Rondon MD Responsible Provider:      Anesthesia Type:  general ASA Status:  1 - Emergent          Anesthesia Type: general    Melinda Phase I:      Melinda Phase II:      Last vitals: Reviewed and per EMR flowsheets.        Anesthesia Post Evaluation    Patient location during evaluation: PACU  Patient participation: complete - patient participated  Level of consciousness: awake  Pain score: 0  Airway patency: patent  Nausea & Vomiting: no nausea and no vomiting  Complications: no  Cardiovascular status: blood pressure returned to baseline  Respiratory status: acceptable  Hydration status: euvolemic

## 2019-05-05 NOTE — ED NOTES
Patient presents to the ED with complaints of dysphagia. Patient states over the past few months he has had episodes of feeling like things are getting stuck in his throat. He states the last time it happened it was with chicken but the symptoms passed on their own. Tonight he was out to dinner when he was eating steak and lobster and the symptoms returned. Patient states that he feels like he has to throw up and he can't swallow at all when it happens. When asked if he could right now patient states that he is afraid of what would happen if he drank any water. Patient is alert and oriented, respirations are even and unlabored, skin is warm dry and intact.       Aby Leiva RN  05/04/19 2215

## 2019-05-05 NOTE — PROGRESS NOTES
[facility]    Gastroenterology Daily Progress Note      Patient:   Yeyo Alcaraz   :    1985   Facility:   [facility]  Date:     2019  Consultant:   Mark Melissa CNP      SUBJECTIVE  36 y.o. male admitted 2019 with Food impaction of esophagus, initial encounter [Y97.623N] and seen for food impaction. The pt was seen and examined. He is s/p EGD early this morning that showed food impacted with a 2cm laceration of the esophagus. His xray did not show an esophageal tear. He has not had any chest pain or shortness of breath overnight. He denies pharyngitis.          OBJECTIVE  Scheduled Meds:   sodium chloride flush  10 mL Intravenous 2 times per day    pantoprazole  40 mg Intravenous 2 times per day    And    sodium chloride (PF)  10 mL Intravenous 2 times per day    piperacillin-tazobactam  3.375 g Intravenous Q8H       Vital Signs:  /72   Pulse 63   Temp 97.3 °F (36.3 °C) (Temporal)   Resp 16   Ht 5' 9\" (1.753 m)   Wt 262 lb 14.4 oz (119.3 kg)   SpO2 97%   BMI 38.82 kg/m²      Physical Exam:   General appearance: Alert & oriented, NAD  Lungs: CTA bilaterally    Heart: S1S2 RRR  Abdomen: Soft, Nontender, Not distended, BS WNL obese  Skin: No jaundice, No clubbing, No cyanosis    Lab and Imaging Review     CBC  Recent Labs     19  2200   WBC 6.7   HGB 15.0   HCT 47.1   MCV 84.0          BMP  Recent Labs     19  2200      K 3.9      CO2 22   BUN 14   CREATININE 1.11   GLUCOSE 92   CALCIUM 9.0        DATE OF PROCEDURE: 2019      SURGEON: Ginger Pradhan MD  Facility:   ASSISTANT: None  Anesthesia: Mac  PREOPERATIVE DIAGNOSIS:   Food impaction     Diagnosis:  Large food impaction at the distal esophagus and in a small hiatal hernia sac, we was pushed through the stomach, but while I was pushing it down to the stomach gently the patient coughed forcefully and subtly and jammed that area again his the scope, after that the food was pushed to the stomach but I see a 2 cm laceration right at the GE junction, which was fairly deep, I put one clip on the top and around with another clip under that but the patient kept coughing, and Breathing very hardly moving the lower area very badly where I felt unsafe anymore to stay there not to extend the laceration he had. The scope was pulled  Patient was awake after the anesthesia stopped  He denied any chest pain he denied any shortness breath is vitals To be very normal  But because of the laceration being so deep medicine for stat x-ray at this time  Might need also a stat CT chest if there is any concern on the chest x-ray  And then will keep this patient for observation and put him on antibiotics in case she had a perforation  Patient breath sounds were equal and clear, and there is no crepitus under the skin to indicate perforation clinically, but will get the x-rays as mentioned above        The food right now it is in the stomach and the stomach looked normal  Duodenum was not examined because we have to pull out          OPERATION: Upper GI endoscopy with Biopsy        Findings:     Retropharyngeal area was grossly normal appearing     Large food impaction at the distal esophagus and in a small hiatal hernia sac, we was pushed through the stomach, but while I was pushing it down to the stomach gently the patient coughed forcefully and subtly and jammed that area again his the scope, after that the food was pushed to the stomach but I see a 2 cm laceration right at the GE junction, which was fairly deep, I put one clip on the top and around with another clip under that but the patient kept coughing, and Breathing very hardly moving the lower area very badly where I felt unsafe anymore to stay there not to extend the laceration he had.   The scope was pulled  Patient was awake after the anesthesia stopped  He denied any chest pain he denied any shortness breath is vitals To be very normal  But because of the laceration being so deep medicine for stat x-ray at this time  Might need also a stat CT chest if there is any concern on the chest x-ray  And then will keep this patient for observation and put him on antibiotics in case she had a perforation  Patient breath sounds were equal and clear, and there is no crepitus under the skin to indicate perforation clinically, but will get the x-rays as mentioned above        The food right now it is in the stomach and the stomach looked normal  Duodenum was not examined because we have to pull out        FINDINGS:ct neck 5/4/19   PHARYNX/LARYNX:  The palatine tonsils are normal in appearance.  The tongue   is normal in appearance.  The valleculae, epiglottis, aryepiglottic folds and   pyriform sinuses appear unremarkable.  The true and false vocal cords are   normal in appearance.  No mass or abscess is seen.       SALIVARY GLANDS/THYROID:  The parotid and submandibular glands appear   unremarkable.  The thyroid gland appears unremarkable.       LYMPH NODES:  No cervical or supraclavicular lymphadenopathy is seen.       SOFT TISSUES:  No appreciable soft tissue swelling or mass is seen.       BRAIN/ORBITS/SINUSES:  The visualized portion of the intracranial contents   appear unremarkable.  The visualized portion of the orbits and mastoid air   cells demonstrate no acute abnormality.  Mild pansinus disease.       LUNG APICES/SUPERIOR MEDIASTINUM:  No focal consolidation is seen within the   visualized lung apices.  No superior mediastinal lymphadenopathy or mass. The visualized portion of the trachea appears unremarkable.       BONES:  No aggressive appearing lytic or blastic bony lesion.  Mild   multilevel cervical spondylosis.            Impression   No acute abnormality of the soft tissue structures of the neck.  No   radiopaque foreign body.       Mild paranasal sinus disease.             FINDINGS:ct chest 5/4/19   Mediastinum: There is a small hiatal hernia with central soft tissue nodule   (axial image [id number]).  The esophagus proximal to the small hiatal hernia is   slightly dilated and filled with fluid and gas.  There is no mediastinal   adenopathy.  Thoracic aorta is normal in caliber.  Heart size is normal.       Lungs/pleura: The lungs are clear.  No pneumothorax or pleural fluid.       Upper Abdomen: Scans performed through the upper abdomen are unremarkable. There is contrast material in the kidneys from recent contrast CT examination   of the neck.       Soft Tissues/Bones: There is no acute bone or soft tissue abnormality.           Impression   There is a small hiatal hernia with central soft tissue nodule suspicious for   food bolus impaction.  Follow-up endoscopy may be required.       The esophagus proximal to the hiatal hernia is mildly dilated.       Chest CT is otherwise unremarkable.           FINDINGS:cxr 5/5/19   Heart size and configuration are normal.  Study was obtained at low lung   volumes.  The lungs are clear.  There is no pneumothorax or pleural fluid. No evidence of pneumomediastinum.  No acute bone finding.           Impression   No acute cardiopulmonary disease.       No evidence of pneumomediastinum. ASSESSMENT/PLAN:  1. Food impaction s/p egd with laceration to the esophagus; doing well with no respiratory distress  -will advance diet to clear liquid  -continue the ppi  -will add carafate  Upon discharge the pt should not have solid food for one week, he should continue the ppi and carafate for 2 weeks and he needs follow up with Dr Roxy Keyes in the office 2 weeks  -likely home later today         This plan was formulated in collaboration with  .     Electronically signed by: MIL Becerril CNP on 5/5/2019 at 7:09 AM     Attending Physician Statement  I have discussed the care of Manuel Clinton and   I have examined the patient myselft and taken ros and hpi , including pertinent history and exam findings,  with the author of this note . I have reviewed the key elements of all parts of the encounter with the nurse practitioner/resident.     I agree with the assessment, plan and orders as documented by the above health care provider       Electronically signed by Miki Corral MD

## 2019-05-15 ENCOUNTER — OFFICE VISIT (OUTPATIENT)
Dept: GASTROENTEROLOGY | Age: 34
End: 2019-05-15
Payer: COMMERCIAL

## 2019-05-15 VITALS
SYSTOLIC BLOOD PRESSURE: 128 MMHG | WEIGHT: 255 LBS | BODY MASS INDEX: 37.66 KG/M2 | DIASTOLIC BLOOD PRESSURE: 81 MMHG | HEART RATE: 59 BPM

## 2019-05-15 DIAGNOSIS — T18.128D FOOD IMPACTION OF ESOPHAGUS, SUBSEQUENT ENCOUNTER: Primary | ICD-10-CM

## 2019-05-15 PROCEDURE — 99214 OFFICE O/P EST MOD 30 MIN: CPT | Performed by: INTERNAL MEDICINE

## 2019-05-15 ASSESSMENT — ENCOUNTER SYMPTOMS
VOMITING: 0
ABDOMINAL PAIN: 0
TROUBLE SWALLOWING: 0
ANAL BLEEDING: 0
BLOOD IN STOOL: 0
WHEEZING: 0
COUGH: 0
DIARRHEA: 0
NAUSEA: 0
RECTAL PAIN: 0
ABDOMINAL DISTENTION: 0
CHOKING: 0
CONSTIPATION: 0

## 2019-05-15 NOTE — PROGRESS NOTES
after that the food was pushed to the stomach but I see a 2 cm laceration right at the GE junction, which was fairly deep, I put one clip on the top and around with another clip under that but the patient kept coughing, and Breathing very hardly moving the lower area very badly where I felt unsafe anymore to stay there not to extend the laceration he had. The scope was pulled  Patient was awake after the anesthesia stopped  He denied any chest pain he denied any shortness breath is vitals To be very normal  But because of the laceration being so deep medicine for stat x-ray at this time  Might need also a stat CT chest if there is any concern on the chest x-ray  And then will keep this patient for observation and put him on antibiotics in case she had a perforation  Patient breath sounds were equal and clear, and there is no crepitus under the skin to indicate perforation clinically, but will get the x-rays as mentioned above        The food right now it is in the stomach and the stomach looked normal  Duodenum was not examined because we have to pull out              POSTOPERATIVE DIAGNOSIS: As described below     OPERATION: Upper GI endoscopy with Biopsy     ANESTHESIA: Moderate Sedation      ESTIMATED BLOOD LOSS: Less than 50 ml     COMPLICATIONS: None.      SPECIMENS:  Was Not Obtained     HISTORY: The patient is a 36y.o. year old male with history of above preop diagnosis. I recommended esophagogastroduodenoscopy with possible biopsy and I explained the risk, benefits, expected outcome, and alternatives to the procedure. Risks included but are not limited to bleeding, infection, respiratory distress, hypotension, and perforation of the esophagus, stomach, or duodenum. Patient understands and is in agreement.     PROCEDURE: The patient was given IV conscious sedation. The patient's SPO2 remained above 90% throughout the procedure. The gastroscope was inserted orally and advanced under direct vision through the esophagus, through the stomach,      Post sedation note : The patient's SPO2 remained above 90% throughout the procedure. the vital signs remained stable      Findings:     Retropharyngeal area was grossly normal appearing     Large food impaction at the distal esophagus and in a small hiatal hernia sac, we was pushed through the stomach, but while I was pushing it down to the stomach gently the patient coughed forcefully and subtly and jammed that area again his the scope, after that the food was pushed to the stomach but I see a 2 cm laceration right at the GE junction, which was fairly deep, I put one clip on the top and around with another clip under that but the patient kept coughing, and Breathing very hardly moving the lower area very badly where I felt unsafe anymore to stay there not to extend the laceration he had. The scope was pulled  Patient was awake after the anesthesia stopped  He denied any chest pain he denied any shortness breath is vitals To be very normal  But because of the laceration being so deep medicine for stat x-ray at this time  Might need also a stat CT chest if there is any concern on the chest x-ray  And then will keep this patient for observation and put him on antibiotics in case she had a perforation  Patient breath sounds were equal and clear, and there is no crepitus under the skin to indicate perforation clinically, but will get the x-rays as mentioned above        The food right now it is in the stomach and the stomach looked normal  Duodenum was not examined because we have to pull out        The scope was removed and the patient tolerated the procedure well.      Recommendations/Plan:   1. Stat PA and lateral chest x-ray rule out mediastinal air rule out pneumothorax rule out perforation of the esophagus  2. Will admit tonight if there is no perforation obvious on the x-ray  3.  If there is perforation with consult with cardiothoracic surgery and see if we need to transfer this patient to another facility  4. Patient was given a stat dose of Zosyn in case of perforation  5. Down the road the patient is to be on Carafate needs to be on PPI and is to be followed outpatient     Electronically signed by Christy Gonzalez MD  on 5/5/2019 at 1:45 AM            Past Medical,Family, and Social History reviewed and does contribute to the patient presentingcondition. Patient's PMH/PSH,SH,PSYCH Hx, MEDs, ALLERGIES, and ROS were all reviewed and updated in the appropriate sections. PAST MEDICAL HISTORY:  No past medical history on file. Past Surgical History:   Procedure Laterality Date    ENDOSCOPY, COLON, DIAGNOSTIC  05/05/2019    foreign body removal and application of hemostatic clips    UPPER GASTROINTESTINAL ENDOSCOPY N/A 5/5/2019    EGD FOREIGN BODY REMOVAL AND APPLICATION OF HEMOSTATIC CLIPS performed by Christy Gonzalez MD at [address] [place] [address] Avenue:    Current Outpatient Medications:     pantoprazole (PROTONIX) 40 MG tablet, Take 1 tablet by mouth 2 times daily (before meals) for 15 days, Disp: 30 tablet, Rfl: 0    sucralfate (CARAFATE) 1 GM tablet, Take 1 tablet by mouth 4 times daily for 15 days, Disp: 60 tablet, Rfl: 0    ALLERGIES:   No Known Allergies    FAMILY HISTORY: No family history on file.       SOCIAL HISTORY:   Social History     Socioeconomic History    Marital status:      Spouse name: Not on file    Number of children: Not on file    Years of education: Not on file    Highest education level: Not on file   Occupational History    Not on file   Social Needs    Financial resource strain: Not on file    Food insecurity:     Worry: Not on file     Inability: Not on file    Transportation needs:     Medical: Not on file     Non-medical: Not on file   Tobacco Use    Smoking status: Not on file   Substance and Sexual Activity    Alcohol use: Not on file    Drug use: Not on file    Sexual activity: Not on file   Lifestyle    Physical activity: Days per week: Not on file     Minutes per session: Not on file    Stress: Not on file   Relationships    Social connections:     Talks on phone: Not on file     Gets together: Not on file     Attends [employer] service: Not on file     Active member of club or organization: Not on file     Attends meetings of clubs or organizations: Not on file     Relationship status: Not on file    Intimate partner violence:     Fear of current or ex partner: Not on file     Emotionally abused: Not on file     Physically abused: Not on file     Forced sexual activity: Not on file   Other Topics Concern    Not on file   Social History Narrative    Not on file       REVIEW OF SYSTEMS: A 12-point review of systemswas obtained and pertinent positives and negatives were enumerated above in the history of present illness. All other reviewed systems / symptoms were negative. Review of Systems   Constitutional: Negative for appetite change, fatigue and unexpected weight change. HENT: Negative for trouble swallowing. Respiratory: Negative for cough, choking and wheezing. Cardiovascular: Negative for chest pain, palpitations and leg swelling. Gastrointestinal: Negative for abdominal distention, abdominal pain, anal bleeding, blood in stool, constipation, diarrhea, nausea, rectal pain and vomiting. Genitourinary: Negative for difficulty urinating. Allergic/Immunologic: Negative for environmental allergies and food allergies. Neurological: Negative for dizziness, weakness, light-headedness, numbness and headaches. Hematological: Does not bruise/bleed easily. Psychiatric/Behavioral: Negative for sleep disturbance. The patient is not nervous/anxious.             LABORATORY DATA: Reviewed  Lab Results   Component Value Date    WBC 6.7 05/04/2019    HGB 15.0 05/04/2019    HCT 47.1 05/04/2019    MCV 84.0 05/04/2019     05/04/2019     05/04/2019    K 3.9 05/04/2019     05/04/2019    CO2 22 05/04/2019    BUN 14 05/04/2019    CREATININE 1.11 05/04/2019         Lab Results   Component Value Date    RBC 5.61 05/04/2019    HGB 15.0 05/04/2019    MCV 84.0 05/04/2019    MCH 26.7 05/04/2019    MCHC 31.8 05/04/2019    RDW 13.6 05/04/2019    MPV 11.2 05/04/2019    BASOPCT 1 05/04/2019    LYMPHSABS 1.80 05/04/2019    MONOSABS 0.48 05/04/2019    NEUTROABS 3.80 05/04/2019    EOSABS 0.59 (H) 05/04/2019    BASOSABS 0.05 05/04/2019         DIAGNOSTIC TESTING:     Xr Chest Standard (2 Vw)    Result Date: 5/5/2019  EXAMINATION: TWO VIEWS OF THE CHEST 5/5/2019 1:55 am COMPARISON: 05/04/2019 HISTORY: ORDERING SYSTEM PROVIDED HISTORY: esophageal laceration , r/o perforation TECHNOLOGIST PROVIDED HISTORY: esophageal laceration , r/o perforation Ordering Physician Provided Reason for Exam: Esophageal laceration, r/o perforation Acuity: Acute Type of Exam: Initial FINDINGS: Heart size and configuration are normal.  Study was obtained at low lung volumes. The lungs are clear. There is no pneumothorax or pleural fluid. No evidence of pneumomediastinum. No acute bone finding. No acute cardiopulmonary disease. No evidence of pneumomediastinum. Xr Chest Standard (2 Vw)    Result Date: 5/4/2019  EXAMINATION: TWO VIEWS OF THE CHEST 5/4/2019 10:06 pm COMPARISON: None. HISTORY: ORDERING SYSTEM PROVIDED HISTORY: resolved choking episode while eating. Foreign body sensation TECHNOLOGIST PROVIDED HISTORY: resolved choking episode while eating. Foreign body sensation Ordering Physician Provided Reason for Exam: FB sensation in throat Acuity: Acute Type of Exam: Initial FINDINGS: Lungs are clear. No cardiomegaly. No pulmonary edema. Negative chest radiographs.      Ct Soft Tissue Neck W Contrast    Result Date: 5/4/2019  EXAMINATION: CT OF THE NECK SOFT TISSUE WITH CONTRAST  5/4/2019 TECHNIQUE: CT of the neck was performed with the administration of intravenous contrast. Multiplanar reformatted images are provided for review. Dose modulation, iterative reconstruction, and/or weight based adjustment of the mA/kV was utilized to reduce the radiation dose to as low as reasonably achievable. COMPARISON: None. HISTORY: ORDERING SYSTEM PROVIDED HISTORY: Foreign body sensation, choking episode while eating prior to arrival FINDINGS: PHARYNX/LARYNX:  The palatine tonsils are normal in appearance. The tongue is normal in appearance. The valleculae, epiglottis, aryepiglottic folds and pyriform sinuses appear unremarkable. The true and false vocal cords are normal in appearance. No mass or abscess is seen. SALIVARY GLANDS/THYROID:  The parotid and submandibular glands appear unremarkable. The thyroid gland appears unremarkable. LYMPH NODES:  No cervical or supraclavicular lymphadenopathy is seen. SOFT TISSUES:  No appreciable soft tissue swelling or mass is seen. BRAIN/ORBITS/SINUSES:  The visualized portion of the intracranial contents appear unremarkable. The visualized portion of the orbits and mastoid air cells demonstrate no acute abnormality. Mild pansinus disease. LUNG APICES/SUPERIOR MEDIASTINUM:  No focal consolidation is seen within the visualized lung apices. No superior mediastinal lymphadenopathy or mass. The visualized portion of the trachea appears unremarkable. BONES:  No aggressive appearing lytic or blastic bony lesion. Mild multilevel cervical spondylosis. No acute abnormality of the soft tissue structures of the neck. No radiopaque foreign body. Mild paranasal sinus disease. Ct Chest Wo Contrast    Result Date: 5/4/2019  EXAMINATION: CT OF THE CHEST WITHOUT CONTRAST 5/4/2019 10:42 pm TECHNIQUE: CT of the chest was performed without the administration of intravenous contrast. Multiplanar reformatted images are provided for review. Dose modulation, iterative reconstruction, and/or weight based adjustment of the mA/kV was utilized to reduce the radiation dose to as low as reasonably achievable.  COMPARISON: Chest x-ray 05/04/2019 at 2206 hours HISTORY: ORDERING SYSTEM PROVIDED HISTORY: fb sensation FINDINGS: Mediastinum: There is a small hiatal hernia with central soft tissue nodule (axial image [id number]). The esophagus proximal to the small hiatal hernia is slightly dilated and filled with fluid and gas. There is no mediastinal adenopathy. Thoracic aorta is normal in caliber. Heart size is normal. Lungs/pleura: The lungs are clear. No pneumothorax or pleural fluid. Upper Abdomen: Scans performed through the upper abdomen are unremarkable. There is contrast material in the kidneys from recent contrast CT examination of the neck. Soft Tissues/Bones: There is no acute bone or soft tissue abnormality. There is a small hiatal hernia with central soft tissue nodule suspicious for food bolus impaction. Follow-up endoscopy may be required. The esophagus proximal to the hiatal hernia is mildly dilated. Chest CT is otherwise unremarkable. PHYSICAL EXAMINATION: Vital signs reviewed per the nursing documentation. /81   Pulse 59   Wt 255 lb (115.7 kg)   BMI 37.66 kg/m²   Body mass index is 37.66 kg/m². Physical Exam   Constitutional: He is oriented to person, place, and time. He appears well-developed and well-nourished. No distress. HENT:   Head: Normocephalic and atraumatic. Mouth/Throat: Oropharynx is clear and moist.   Eyes: Pupils are equal, round, and reactive to light. No scleral icterus. Neck: Normal range of motion. Neck supple. No JVD present. No tracheal deviation present. No thyromegaly present. Cardiovascular: Normal rate, regular rhythm, normal heart sounds and intact distal pulses. No murmur heard. Pulmonary/Chest: Effort normal and breath sounds normal. No respiratory distress. He has no wheezes. Abdominal: Soft. Bowel sounds are normal. He exhibits no distension and no mass. There is no tenderness. There is no rebound and no guarding.    Musculoskeletal: Normal range of motion. He exhibits no edema or tenderness. Neurological: He is alert and oriented to person, place, and time. He has normal reflexes. Skin: Skin is warm. No rash noted. He is not diaphoretic. No erythema. No pallor. He is not diaphoretic   Psychiatric: He has a normal mood and affect. His behavior is normal. Judgment and thought content normal.   Nursing note and vitals reviewed. IMPRESSION: Mr. Amber Thomson is a 36 y.o. male with      Diagnosis Orders   1. Food impaction of esophagus, subsequent encounter  EGD       Will continue with Protonix and Carafate for now  Will proceed with an EGD to see if he needs any dilatation and will take biopsies also have to be very careful with this patient  Will talk to anesthesia and the same day to sedate well and observe very well to avoid him we cannot just like he did last  Diet/life style/natural hx /complication of the dx were all explained in details     Past medical, past surgical, social history, psychiatric history, medications or allergies, all reviewed and  updated      Thank you for allowing me to participate in the care of Mr. Amber Thomson. For any further questions please do not hesitate to contact me. I have reviewed and agree with the ROS entered by the MA/LPN. Note is dictated utilizing voice recognition software. Unfortunately this leads to occasional typographical errors. Please contact our office if you have any questions.       Michael Reyes MD  [facility]  O: #[phone]

## 2019-06-21 ENCOUNTER — TELEPHONE (OUTPATIENT)
Dept: GASTROENTEROLOGY | Age: 34
End: 2019-06-21

## 2019-06-21 NOTE — TELEPHONE ENCOUNTER
Writer spoke to pt over phone & he confirms he will be here for proc sched maría/ Judy at [address] Fri 6/28/19 @ 2:30pm proc time, 1pm arrival time. Pt confirms he will have a  & he states he has not received his prep instructions at his email address. Writer advised pt she would re-sent instructions through his email. Writer verified email we have on pt's chart & pt confirms we have the correct address.

## 2019-07-16 ENCOUNTER — TELEPHONE (OUTPATIENT)
Dept: GASTROENTEROLOGY | Age: 34
End: 2019-07-16

## 2019-11-15 ENCOUNTER — TELEPHONE (OUTPATIENT)
Dept: GASTROENTEROLOGY | Age: 34
End: 2019-11-15

## 2019-11-22 ENCOUNTER — TELEPHONE (OUTPATIENT)
Dept: GASTROENTEROLOGY | Age: 34
End: 2019-11-22

## 2020-01-23 ENCOUNTER — TELEPHONE (OUTPATIENT)
Dept: GASTROENTEROLOGY | Age: 35
End: 2020-01-23

## 2022-04-11 ENCOUNTER — HOSPITAL ENCOUNTER (OUTPATIENT)
Age: 37
Setting detail: SPECIMEN
Discharge: HOME | End: 2022-04-11

## 2022-04-11 LAB
ABSOLUTE EOS #: 0.45 K/UL (ref 0–0.44)
ABSOLUTE IMMATURE GRANULOCYTE: <0.03 K/UL (ref 0–0.3)
ABSOLUTE LYMPH #: 1.61 K/UL (ref 1.1–3.7)
ABSOLUTE MONO #: 0.45 K/UL (ref 0.1–1.2)
ALBUMIN SERPL-MCNC: 4.6 G/DL (ref 3.5–5.2)
ALBUMIN/GLOBULIN RATIO: 1.7 (ref 1–2.5)
ALP BLD-CCNC: 56 U/L (ref 40–129)
ALT SERPL-CCNC: 47 U/L (ref 5–41)
ANION GAP SERPL CALCULATED.3IONS-SCNC: 13 MMOL/L (ref 9–17)
AST SERPL-CCNC: 32 U/L
BASOPHILS # BLD: 1 % (ref 0–2)
BASOPHILS ABSOLUTE: 0.05 K/UL (ref 0–0.2)
BILIRUB SERPL-MCNC: 0.33 MG/DL (ref 0.3–1.2)
BILIRUBIN URINE: NEGATIVE
BUN BLDV-MCNC: 16 MG/DL (ref 6–20)
CALCIUM SERPL-MCNC: 9.2 MG/DL (ref 8.6–10.4)
CHLORIDE BLD-SCNC: 104 MMOL/L (ref 98–107)
CHOLESTEROL, FASTING: 220 MG/DL
CHOLESTEROL/HDL RATIO: 2.5
CO2: 23 MMOL/L (ref 20–31)
COLOR: YELLOW
COMMENT UA: NORMAL
CREAT SERPL-MCNC: 1.17 MG/DL (ref 0.7–1.2)
EOSINOPHILS RELATIVE PERCENT: 8 % (ref 1–4)
GFR AFRICAN AMERICAN: >60 ML/MIN
GFR NON-AFRICAN AMERICAN: >60 ML/MIN
GFR SERPL CREATININE-BSD FRML MDRD: ABNORMAL ML/MIN/{1.73_M2}
GLUCOSE FASTING: 99 MG/DL (ref 70–99)
GLUCOSE URINE: NEGATIVE
HCT VFR BLD CALC: 50.5 % (ref 40.7–50.3)
HDLC SERPL-MCNC: 87 MG/DL
HEMOGLOBIN: 15.8 G/DL (ref 13–17)
IMMATURE GRANULOCYTES: 0 %
KETONES, URINE: NEGATIVE
LDL CHOLESTEROL: 113 MG/DL (ref 0–130)
LEUKOCYTE ESTERASE, URINE: NEGATIVE
LYMPHOCYTES # BLD: 29 % (ref 24–43)
MCH RBC QN AUTO: 27.1 PG (ref 25.2–33.5)
MCHC RBC AUTO-ENTMCNC: 31.3 G/DL (ref 28.4–34.8)
MCV RBC AUTO: 86.8 FL (ref 82.6–102.9)
MONOCYTES # BLD: 8 % (ref 3–12)
NITRITE, URINE: NEGATIVE
NRBC AUTOMATED: 0 PER 100 WBC
PDW BLD-RTO: 14.5 % (ref 11.8–14.4)
PH UA: 5 (ref 5–8)
PLATELET # BLD: 179 K/UL (ref 138–453)
PMV BLD AUTO: 11.4 FL (ref 8.1–13.5)
POTASSIUM SERPL-SCNC: 4.3 MMOL/L (ref 3.7–5.3)
PROTEIN UA: NEGATIVE
RBC # BLD: 5.82 M/UL (ref 4.21–5.77)
RBC # BLD: ABNORMAL 10*6/UL
SEG NEUTROPHILS: 54 % (ref 36–65)
SEGMENTED NEUTROPHILS ABSOLUTE COUNT: 3.04 K/UL (ref 1.5–8.1)
SODIUM BLD-SCNC: 140 MMOL/L (ref 135–144)
SPECIFIC GRAVITY UA: 1.02 (ref 1–1.03)
TOTAL PROTEIN: 7.3 G/DL (ref 6.4–8.3)
TRIGLYCERIDE, FASTING: 99 MG/DL
TSH SERPL DL<=0.05 MIU/L-ACNC: 1.79 UIU/ML (ref 0.3–5)
TURBIDITY: CLEAR
URINE HGB: NEGATIVE
UROBILINOGEN, URINE: NORMAL
VITAMIN D 25-HYDROXY: 19 NG/ML
WBC # BLD: 5.6 K/UL (ref 3.5–11.3)

## 2023-09-07 ENCOUNTER — OFFICE VISIT (OUTPATIENT)
Dept: PODIATRY | Age: 38
End: 2023-09-07

## 2023-09-07 VITALS — BODY MASS INDEX: 39.99 KG/M2 | WEIGHT: 270 LBS | HEIGHT: 69 IN

## 2023-09-07 DIAGNOSIS — L03.031 PARONYCHIA OF GREAT TOE OF RIGHT FOOT: Primary | ICD-10-CM

## 2023-09-07 RX ORDER — ACETAMINOPHEN 500 MG
TABLET ORAL
COMMUNITY
Start: 2023-06-29

## 2023-09-07 RX ORDER — IBUPROFEN 800 MG/1
800 TABLET ORAL 2 TIMES DAILY
COMMUNITY
Start: 2023-08-23

## 2023-09-07 NOTE — PROGRESS NOTES
History     Tobacco Use    Smoking status: Some Days     Types: Cigars    Smokeless tobacco: Never   Substance Use Topics    Alcohol use: Not on file       Lower Extremity Physical Examination:     Vitals: There were no vitals filed for this visit. General: AAO x 3 in NAD. Integument: Incurvated toenail with localized swelling, pain, erythema, and purulence 1st Right medial border    Dermatologic Exam:  Skin lesion/ulceration Absent . Skin No rashes or nodules noted. .       Musculoskeletal:     1st MPJ ROM decreased, Bilateral.  Muscle strength 5/5, Bilateral.  Pain present upon palpation of right hallux. Medial longitudinal arch, Bilateral WNL. Ankle ROM WNL,Bilateral.    Dorsally contracted digits absent digits 1-5 Bilateral.     Vascular: DP and PT pulses palpable 2/4, Bilateral.  CFT <3 seconds, Bilateral.  Hair growth present to the level of the digits, Bilateral.  Edema absent, Bilateral.  Varicosities absent, Bilateral. Erythema absent, Bilateral    Neurological: Sensation intact to light touch to level of digits, Bilateral.  Protective sensation intact 10/10 sites via 5.07/10g [place] Monofilament, Bilateral.  negative Tinel's, Bilateral.  negative Valleix sign, Bilateral.      Integument: Warm, dry, supple, Bilateral.  Open lesion absent, Bilateral.  Interdigital maceration absent to web spaces 1-4, Bilateral.  Fissures absent, Bilateral.     Asessment: Patient is a 41 y.o. male with:      Diagnosis Orders   1. Paronychia of great toe of right foot  AL EXCISION NAIL MATRIX PERMANENT REMOVAL          Plan: Patient examined and evaluated. Current condition and treatment options discussed in detail. Verbal consent obtained for chemical matrixectomy after all questions answered. Local anesthesia obtained via Hallux block to the Right hallux utilizing 5 cc of 1% Lidocaine plain. Next the Right hallux was prepped with Betadine. A digital tourniquet was applied.   A freer elevator was

## 2023-09-13 ENCOUNTER — OFFICE VISIT (OUTPATIENT)
Dept: PODIATRY | Age: 38
End: 2023-09-13

## 2023-09-13 VITALS — HEIGHT: 69 IN | BODY MASS INDEX: 38.51 KG/M2 | WEIGHT: 260 LBS

## 2023-09-13 DIAGNOSIS — Z98.890 POST-OPERATIVE STATE: Primary | ICD-10-CM

## 2023-09-13 PROCEDURE — 99024 POSTOP FOLLOW-UP VISIT: CPT | Performed by: PODIATRIST

## 2023-09-13 NOTE — PROGRESS NOTES
[address] [facility] [address] [place] [address]  Dept: [phone]  Dept Fax: [phone]    POST-OP PROGRESS NOTE  Date of patient's visit: 9/13/2023  Patient's Name:  Rosa Knight YOB: 1985            Patient Care Team:  Alva Burrows MD as PCP - General (Family Medicine)  Stephanie Roche MD as Consulting Physician (Gastroenterology)        Chief Complaint   Patient presents with    Post-Op Check     1 week ign right great toe            Subjective: Rosa Knight is a 41 y.o. male who presents to the office today 1 week(s)  S/P right great ingrown toenail removal for correction of right great toe pain and discomfort. Problem List Items Addressed This Visit    None  Patient relates pain is Present and improved. Pain is rated 2 out of 10 and is described as constant, mild. Currently denies F/C/N/V. Is patient taking pain medications as prescribed and is controlling pain n/a    Physical Examination:  Minimal bleeding post operatively. Edema present. No erythema. No Pus. Operative correction is satisfactory. Assessment: Rosa Knight is status post as above  Normal post operative course. Doing well        No diagnosis found. Plan:  Patient examined and evaluated. Current condition and treatment options discussed in detail. Advised pt to monitor daily for infection. Verbal and written instructions given to patient. Orders: No orders of the defined types were placed in this encounter. Contact office with any questions/problems/concerns. RTC in 2month(s).      Electronically signed by Piper Whaley DPM on 9/13/2023 at 2:46 PM  9/13/2023

## (undated) DEVICE — SOLUTION IV IRRIG 500ML 0.9% SODIUM CHL 2F7123

## (undated) DEVICE — SNARE ENDOSCP L240CM LOOP W13MM DIA2.4MM SHT THROW SM OVL

## (undated) DEVICE — SNARE ENDOSCP L240CM LOOP W27MM SHTH DIA2.4MM WRK CHN 2.8MM

## (undated) DEVICE — BASIN EMSIS 700ML GRAPHITE PLAS KID SHP GRAD